# Patient Record
Sex: FEMALE | Race: WHITE | NOT HISPANIC OR LATINO | Employment: FULL TIME | URBAN - METROPOLITAN AREA
[De-identification: names, ages, dates, MRNs, and addresses within clinical notes are randomized per-mention and may not be internally consistent; named-entity substitution may affect disease eponyms.]

---

## 2017-07-11 ENCOUNTER — CLINICAL SUPPORT (OUTPATIENT)
Dept: OCCUPATIONAL MEDICINE | Facility: CLINIC | Age: 52
End: 2017-07-11

## 2017-07-11 DIAGNOSIS — Z02.4 ENCOUNTER FOR CDL (COMMERCIAL DRIVING LICENSE) EXAM: Primary | ICD-10-CM

## 2017-07-11 PROCEDURE — 99499 UNLISTED E&M SERVICE: CPT | Mod: S$GLB,,, | Performed by: NURSE PRACTITIONER

## 2018-07-09 ENCOUNTER — CLINICAL SUPPORT (OUTPATIENT)
Dept: OCCUPATIONAL MEDICINE | Facility: CLINIC | Age: 53
End: 2018-07-09

## 2018-07-09 VITALS
BODY MASS INDEX: 30.9 KG/M2 | HEART RATE: 79 BPM | SYSTOLIC BLOOD PRESSURE: 137 MMHG | HEIGHT: 64 IN | DIASTOLIC BLOOD PRESSURE: 79 MMHG | WEIGHT: 181 LBS

## 2018-07-09 DIAGNOSIS — Z02.1 PRE-EMPLOYMENT EXAMINATION: Primary | ICD-10-CM

## 2018-07-09 PROCEDURE — 99499 UNLISTED E&M SERVICE: CPT | Mod: S$GLB,,, | Performed by: NURSE PRACTITIONER

## 2021-03-01 ENCOUNTER — OFFICE VISIT (OUTPATIENT)
Dept: URGENT CARE | Facility: CLINIC | Age: 56
End: 2021-03-01
Payer: COMMERCIAL

## 2021-03-01 VITALS
TEMPERATURE: 97 F | SYSTOLIC BLOOD PRESSURE: 164 MMHG | HEART RATE: 71 BPM | OXYGEN SATURATION: 100 % | DIASTOLIC BLOOD PRESSURE: 98 MMHG

## 2021-03-01 DIAGNOSIS — J01.40 ACUTE NON-RECURRENT PANSINUSITIS: Primary | ICD-10-CM

## 2021-03-01 DIAGNOSIS — J02.9 SORE THROAT: ICD-10-CM

## 2021-03-01 LAB
CTP QC/QA: YES
CTP QC/QA: YES
MOLECULAR STREP A: NEGATIVE
SARS-COV-2 RDRP RESP QL NAA+PROBE: NEGATIVE

## 2021-03-01 PROCEDURE — 99203 PR OFFICE/OUTPT VISIT, NEW, LEVL III, 30-44 MIN: ICD-10-PCS | Mod: 25,S$GLB,, | Performed by: PHYSICIAN ASSISTANT

## 2021-03-01 PROCEDURE — 96372 THER/PROPH/DIAG INJ SC/IM: CPT | Mod: S$GLB,,, | Performed by: EMERGENCY MEDICINE

## 2021-03-01 PROCEDURE — 87651 STREP A DNA AMP PROBE: CPT | Mod: QW,S$GLB,, | Performed by: PHYSICIAN ASSISTANT

## 2021-03-01 PROCEDURE — 87651 POCT STREP A MOLECULAR: ICD-10-PCS | Mod: QW,S$GLB,, | Performed by: PHYSICIAN ASSISTANT

## 2021-03-01 PROCEDURE — 99203 OFFICE O/P NEW LOW 30 MIN: CPT | Mod: 25,S$GLB,, | Performed by: PHYSICIAN ASSISTANT

## 2021-03-01 PROCEDURE — U0002 COVID-19 LAB TEST NON-CDC: HCPCS | Mod: QW,S$GLB,, | Performed by: PHYSICIAN ASSISTANT

## 2021-03-01 PROCEDURE — 96372 PR INJECTION,THERAP/PROPH/DIAG2ST, IM OR SUBCUT: ICD-10-PCS | Mod: S$GLB,,, | Performed by: EMERGENCY MEDICINE

## 2021-03-01 PROCEDURE — U0002: ICD-10-PCS | Mod: QW,S$GLB,, | Performed by: PHYSICIAN ASSISTANT

## 2021-03-01 RX ORDER — DEXAMETHASONE SODIUM PHOSPHATE 100 MG/10ML
10 INJECTION INTRAMUSCULAR; INTRAVENOUS
Status: COMPLETED | OUTPATIENT
Start: 2021-03-01 | End: 2021-03-01

## 2021-03-01 RX ORDER — DEXTROAMPHETAMINE SACCHARATE, AMPHETAMINE ASPARTATE MONOHYDRATE, DEXTROAMPHETAMINE SULFATE AND AMPHETAMINE SULFATE 5; 5; 5; 5 MG/1; MG/1; MG/1; MG/1
20 CAPSULE, EXTENDED RELEASE ORAL EVERY MORNING
COMMUNITY

## 2021-03-01 RX ORDER — ESTRADIOL 2 MG/1
2 TABLET ORAL EVERY MORNING
COMMUNITY
Start: 2021-02-01

## 2021-03-01 RX ADMIN — DEXAMETHASONE SODIUM PHOSPHATE 10 MG: 100 INJECTION INTRAMUSCULAR; INTRAVENOUS at 06:03

## 2021-05-30 ENCOUNTER — OFFICE VISIT (OUTPATIENT)
Dept: URGENT CARE | Facility: CLINIC | Age: 56
End: 2021-05-30
Payer: COMMERCIAL

## 2021-05-30 VITALS
TEMPERATURE: 98.4 F | DIASTOLIC BLOOD PRESSURE: 88 MMHG | OXYGEN SATURATION: 97 % | SYSTOLIC BLOOD PRESSURE: 136 MMHG | HEART RATE: 77 BPM | RESPIRATION RATE: 16 BRPM | WEIGHT: 181.5 LBS

## 2021-05-30 DIAGNOSIS — H57.89 REDNESS OF EYE, LEFT: Primary | ICD-10-CM

## 2021-05-30 PROCEDURE — 99202 OFFICE O/P NEW SF 15 MIN: CPT | Performed by: NURSE PRACTITIONER

## 2021-05-30 RX ORDER — ESTRADIOL 2 MG/1
2 TABLET ORAL
COMMUNITY
Start: 2021-02-01

## 2021-05-30 RX ORDER — DEXTROAMPHETAMINE SACCHARATE, AMPHETAMINE ASPARTATE MONOHYDRATE, DEXTROAMPHETAMINE SULFATE AND AMPHETAMINE SULFATE 5; 5; 5; 5 MG/1; MG/1; MG/1; MG/1
20 CAPSULE, EXTENDED RELEASE ORAL 2 TIMES DAILY
COMMUNITY

## 2021-05-30 RX ORDER — TOBRAMYCIN 3 MG/ML
1 SOLUTION/ DROPS OPHTHALMIC EVERY 4 HOURS
Qty: 5 ML | Refills: 0 | Status: SHIPPED | OUTPATIENT
Start: 2021-05-30 | End: 2021-06-04

## 2021-05-30 ASSESSMENT — ENCOUNTER SYMPTOMS
COUGH: 0
TROUBLE SWALLOWING: 0
EYE REDNESS: 1
NECK PAIN: 0
FATIGUE: 0
DIZZINESS: 0
EYE DISCHARGE: 1
APPETITE CHANGE: 0
HEADACHES: 0
SINUS PAIN: 0
SORE THROAT: 0
LIGHT-HEADEDNESS: 0
FEVER: 0
CHILLS: 0
ACTIVITY CHANGE: 0

## 2021-05-30 ASSESSMENT — PAIN SCALES - GENERAL: PAINLEVEL: 4

## 2021-05-30 NOTE — PROGRESS NOTES
Yelena Grewal          [id number]      CHIEF COMPLAINT/REASON FOR VISIT  Yelena Grewal is a 56 y.o. female who presents for:  Chief Complaint   Patient presents with   • Eye Problem     Started about 2 days ago. Burning and weeping in left eye. Won't open easily in the mornings with crusty, gooey drainage. The right eye is starting today as well. Sensative to light.       HISTORY OF PRESENT ILLNESS:  HPI  Allergies all the time. Claritin D and Allegra D on occasion.  Took one a few days ago and too one yesterday.  Lives in [place] and has worse allergies in spring but year round.  Has had a lot of bladder issues, including.  The other night felt something in her eye, left eye.  Thought she got something in it while she was in the restaurant.  Tried to flush later.  Night before last was up all night.  Flushed all day yesterday and better.  Swelled today.  Hard to get open today.  Crusty.  She is concerned about pinkeye.  The right eye is draining some as well.  Some blurriness but not constant.     Visine redness is eyedrop she used.  Used water as well.      Patient Care Team:  Pcp No as PCP - General (Family Medicine)     The following portions of the patient's history were reviewed and updated as appropriate: allergies, current medications, family history, medical history, social history, surgical history and problem list.     MEDICATIONS:     Current Outpatient Medications:   •  amphetamine-dextroamphetamine XR (ADDERALL XR) 20 mg 24 hr capsule, Take 20 mg by mouth 2 (two) times a day, Disp: , Rfl:   •  estradioL (ESTRACE) 2 mg tablet, Take 2 mg by mouth, Disp: , Rfl:   •  calcium-mag oxide-vitamin D3 185- mg-mg-unit capsule, Take by mouth, Disp: , Rfl:   •  TESTOSTERONE UNDECANOATE ORAL, Take 50 mg by mouth, Disp: , Rfl:   •  tobramycin (Tobrex) 0.3 % ophthalmic solution, Administer 1 drop into the left eye every 4 (four) hours for 5 days, Disp: 5 mL, Rfl: 0    REVIEW OF SYSTEMS  Review of Systems    Constitutional: Negative for activity change, appetite change, chills, fatigue and fever.   HENT: Positive for congestion and postnasal drip. Negative for sinus pain, sneezing, sore throat and trouble swallowing.         Pressure behind left ear   Eyes: Positive for discharge and redness.   Respiratory: Negative for cough.    Musculoskeletal: Negative for neck pain.   Skin:        Some dryness and irritation under right eye, from eye drainage in night   Allergic/Immunologic: Positive for environmental allergies. Negative for immunocompromised state.   Neurological: Negative for dizziness, light-headedness and headaches.       VITALS  Vitals:    05/30/21 0954   BP: 136/88   Pulse: 77   Resp: 16   Temp: 36.9 °C (98.4 °F)   SpO2: 97%     There is no height or weight on file to calculate BMI.    Wt Readings from Last 3 Encounters:   05/30/21 82.3 kg (181 lb 8 oz)     Temp Readings from Last 3 Encounters:   05/30/21 36.9 °C (98.4 °F) (Temporal)     BP Readings from Last 3 Encounters:   05/30/21 136/88     Pulse Readings from Last 3 Encounters:   05/30/21 77       PHYSICAL EXAMINATION:  Physical Exam  Vitals and nursing note reviewed. Chaperone present: accompanied by .   Constitutional:       General: She is in acute distress.      Appearance: Normal appearance. She is not ill-appearing.   HENT:      Ears:      Comments: Fluid behind both TMs  Eyes:      Extraocular Movements: Extraocular movements intact.      Comments: Redness of left conjunctivae, clear drainage at this time.  No FB noted with exam.  Eye numbed with drop of proparacaine and stained for exam.  Small abrasion noted in lower mid conjunctivae.  No corneal abrasion noted and no foreign body noted.      Very slight redness of right conjunctivae, no discharge noted.   Cardiovascular:      Rate and Rhythm: Normal rate and regular rhythm.      Heart sounds: Normal heart sounds.   Pulmonary:      Effort: Pulmonary effort is normal.      Breath sounds:  Normal breath sounds.   Lymphadenopathy:      Cervical: Cervical adenopathy present.   Skin:     General: Skin is warm and dry.      Findings: No rash.   Neurological:      General: No focal deficit present.      Mental Status: She is alert and oriented to person, place, and time.         Assessment/Plan   Diagnoses and all orders for this visit:    Redness of eye, left  -     tobramycin (Tobrex) 0.3 % ophthalmic solution; Administer 1 drop into the left eye every 4 (four) hours for 5 days    Discussion regarding allergies and prevention of symptoms.  Discussion of many reasons for red eye.  Will cover with antibiotic drops, she should use a good wetting drop and have humidity and protect eyes from irritation for a few days.  Cold packs encouraged.  Should improve in next 24 to 48 hours.    SARA CORREA, CNP

## 2024-06-12 ENCOUNTER — OCCUPATIONAL HEALTH (OUTPATIENT)
Dept: URGENT CARE | Facility: CLINIC | Age: 59
End: 2024-06-12

## 2024-06-12 DIAGNOSIS — Z02.89 ENCOUNTER FOR EXAMINATION REQUIRED BY DEPARTMENT OF TRANSPORTATION (DOT): Primary | ICD-10-CM
